# Patient Record
Sex: FEMALE | Race: WHITE | URBAN - NONMETROPOLITAN AREA
[De-identification: names, ages, dates, MRNs, and addresses within clinical notes are randomized per-mention and may not be internally consistent; named-entity substitution may affect disease eponyms.]

---

## 2022-08-19 ENCOUNTER — OFFICE VISIT (OUTPATIENT)
Dept: URBAN - NONMETROPOLITAN AREA CLINIC 1 | Facility: CLINIC | Age: 62
End: 2022-08-19
Payer: COMMERCIAL

## 2022-08-19 DIAGNOSIS — H52.4 PRESBYOPIA: Primary | ICD-10-CM

## 2022-08-19 PROCEDURE — 92004 COMPRE OPH EXAM NEW PT 1/>: CPT | Performed by: OPTOMETRIST

## 2022-08-19 ASSESSMENT — INTRAOCULAR PRESSURE
OS: 16
OD: 16

## 2022-08-19 ASSESSMENT — KERATOMETRY
OS: 45.38
OD: 46.00

## 2022-08-19 ASSESSMENT — VISUAL ACUITY
OS: 20/20
OD: 20/20

## 2022-08-19 NOTE — IMPRESSION/PLAN
Impression: Presbyopia: H52.4. Plan: Educated pt on exam findings. Updated and finalized SRx. Educated pt on 1-2 week adaptation period with updated SRx. Educated on bifocals vs. progressive lenses. Pt expressed understanding. RTC 1 year for CE, or PRN. Pt declined dilation today. Educated pt the periphery of the back of the eye was unable to be assessed without dilation or photos, pt expressed understanding. Educated pt on signs/symptoms of RDs and RTC ASAP if changes in vision occur. Encouraged pt to RTC for dilated portion of exam within 1 month (okay for tech to dilate with 1% tropicamide after pretesting/vision).

## 2024-02-05 ENCOUNTER — OFFICE VISIT (OUTPATIENT)
Dept: URBAN - METROPOLITAN AREA CLINIC 82 | Facility: CLINIC | Age: 64
End: 2024-02-05
Payer: COMMERCIAL

## 2024-02-05 DIAGNOSIS — H52.4 PRESBYOPIA: Primary | ICD-10-CM

## 2024-02-05 DIAGNOSIS — Z13.5 ENCOUNTER FOR SCREENING FOR EYE AND EAR DISORDERS: ICD-10-CM

## 2024-02-05 PROCEDURE — 92014 COMPRE OPH EXAM EST PT 1/>: CPT | Performed by: OPTOMETRIST

## 2024-02-05 PROCEDURE — 92082 INTERMEDIATE VISUAL FIELD XM: CPT | Performed by: OPTOMETRIST

## 2024-02-05 ASSESSMENT — VISUAL ACUITY
OD: 20/25
OS: 20/25

## 2024-02-05 ASSESSMENT — KERATOMETRY
OS: 45.38
OD: 45.75

## 2024-02-05 ASSESSMENT — INTRAOCULAR PRESSURE
OS: 17
OD: 17

## 2025-06-13 ENCOUNTER — OFFICE VISIT (OUTPATIENT)
Dept: URBAN - METROPOLITAN AREA CLINIC 82 | Facility: CLINIC | Age: 65
End: 2025-06-13
Payer: COMMERCIAL

## 2025-06-13 DIAGNOSIS — H52.4 PRESBYOPIA: Primary | ICD-10-CM

## 2025-06-13 PROCEDURE — 92014 COMPRE OPH EXAM EST PT 1/>: CPT | Performed by: OPTOMETRIST

## 2025-06-13 ASSESSMENT — VISUAL ACUITY
OD: 20/30
OS: 20/30

## 2025-06-13 ASSESSMENT — KERATOMETRY
OD: 45.63
OS: 45.50

## 2025-06-13 ASSESSMENT — INTRAOCULAR PRESSURE
OS: 16
OD: 14